# Patient Record
Sex: MALE | Race: WHITE | NOT HISPANIC OR LATINO | Employment: OTHER | URBAN - METROPOLITAN AREA
[De-identification: names, ages, dates, MRNs, and addresses within clinical notes are randomized per-mention and may not be internally consistent; named-entity substitution may affect disease eponyms.]

---

## 2023-01-01 ENCOUNTER — APPOINTMENT (OUTPATIENT)
Dept: RADIOLOGY | Facility: HOSPITAL | Age: 78
End: 2023-01-01
Payer: MEDICARE

## 2023-01-01 ENCOUNTER — OFFICE VISIT (OUTPATIENT)
Dept: PRIMARY CARE | Facility: CLINIC | Age: 78
End: 2023-01-01
Payer: MEDICARE

## 2023-01-01 ENCOUNTER — HOSPITAL ENCOUNTER (OUTPATIENT)
Dept: CARDIOLOGY | Facility: HOSPITAL | Age: 78
Discharge: HOME | End: 2023-12-01
Payer: MEDICARE

## 2023-01-01 ENCOUNTER — HOSPITAL ENCOUNTER (EMERGENCY)
Facility: HOSPITAL | Age: 78
Discharge: HOME | End: 2023-12-01
Attending: EMERGENCY MEDICINE
Payer: MEDICARE

## 2023-01-01 ENCOUNTER — HOSPITAL ENCOUNTER (EMERGENCY)
Facility: HOSPITAL | Age: 78
End: 2023-12-10
Attending: STUDENT IN AN ORGANIZED HEALTH CARE EDUCATION/TRAINING PROGRAM
Payer: MEDICARE

## 2023-01-01 VITALS
BODY MASS INDEX: 21.98 KG/M2 | HEIGHT: 75 IN | RESPIRATION RATE: 15 BRPM | TEMPERATURE: 97.9 F | OXYGEN SATURATION: 98 % | SYSTOLIC BLOOD PRESSURE: 162 MMHG | WEIGHT: 176.81 LBS | HEART RATE: 46 BPM | DIASTOLIC BLOOD PRESSURE: 65 MMHG

## 2023-01-01 VITALS
RESPIRATION RATE: 18 BRPM | WEIGHT: 176 LBS | DIASTOLIC BLOOD PRESSURE: 74 MMHG | BODY MASS INDEX: 21.88 KG/M2 | OXYGEN SATURATION: 97 % | SYSTOLIC BLOOD PRESSURE: 138 MMHG | HEART RATE: 50 BPM | HEIGHT: 75 IN

## 2023-01-01 VITALS — OXYGEN SATURATION: 94 % | BODY MASS INDEX: 21.99 KG/M2 | WEIGHT: 175.93 LBS

## 2023-01-01 DIAGNOSIS — R63.4 WEIGHT LOSS: ICD-10-CM

## 2023-01-01 DIAGNOSIS — S09.90XA INJURY OF HEAD, INITIAL ENCOUNTER: ICD-10-CM

## 2023-01-01 DIAGNOSIS — I46.9 CARDIAC ARREST (MULTI): Primary | ICD-10-CM

## 2023-01-01 DIAGNOSIS — R00.1 BRADYCARDIA: ICD-10-CM

## 2023-01-01 DIAGNOSIS — R55 SYNCOPE AND COLLAPSE: Primary | ICD-10-CM

## 2023-01-01 DIAGNOSIS — E87.6 HYPOKALEMIA: ICD-10-CM

## 2023-01-01 DIAGNOSIS — S00.01XA ABRASION OF SCALP, INITIAL ENCOUNTER: ICD-10-CM

## 2023-01-01 DIAGNOSIS — I48.20 CHRONIC ATRIAL FIBRILLATION (MULTI): ICD-10-CM

## 2023-01-01 DIAGNOSIS — S00.03XA CONTUSION OF SCALP, INITIAL ENCOUNTER: ICD-10-CM

## 2023-01-01 LAB
ALBUMIN SERPL-MCNC: 4.2 G/DL (ref 3.5–5)
ALP BLD-CCNC: 54 U/L (ref 35–125)
ALT SERPL-CCNC: 12 U/L (ref 5–40)
ANION GAP SERPL CALC-SCNC: 10 MMOL/L
APPEARANCE UR: CLEAR
AST SERPL-CCNC: 21 U/L (ref 5–40)
ATRIAL RATE: 42 BPM
ATRIAL RATE: 77 BPM
BASOPHILS # BLD AUTO: 0.06 X10*3/UL (ref 0–0.1)
BASOPHILS NFR BLD AUTO: 0.7 %
BILIRUB SERPL-MCNC: 1.9 MG/DL (ref 0.1–1.2)
BILIRUB UR STRIP.AUTO-MCNC: NEGATIVE MG/DL
BUN SERPL-MCNC: 16 MG/DL (ref 8–25)
CALCIUM SERPL-MCNC: 9.2 MG/DL (ref 8.5–10.4)
CHLORIDE SERPL-SCNC: 99 MMOL/L (ref 97–107)
CO2 SERPL-SCNC: 29 MMOL/L (ref 24–31)
COLOR UR: YELLOW
CREAT SERPL-MCNC: 0.8 MG/DL (ref 0.4–1.6)
EOSINOPHIL # BLD AUTO: 0.15 X10*3/UL (ref 0–0.4)
EOSINOPHIL NFR BLD AUTO: 1.7 %
ERYTHROCYTE [DISTWIDTH] IN BLOOD BY AUTOMATED COUNT: 12.3 % (ref 11.5–14.5)
GFR SERPL CREATININE-BSD FRML MDRD: 90 ML/MIN/1.73M*2
GLUCOSE SERPL-MCNC: 119 MG/DL (ref 65–99)
GLUCOSE UR STRIP.AUTO-MCNC: NORMAL MG/DL
HCT VFR BLD AUTO: 48.6 % (ref 41–52)
HGB BLD-MCNC: 17.9 G/DL (ref 13.5–17.5)
IMM GRANULOCYTES # BLD AUTO: 0.03 X10*3/UL (ref 0–0.5)
IMM GRANULOCYTES NFR BLD AUTO: 0.3 % (ref 0–0.9)
KETONES UR STRIP.AUTO-MCNC: NEGATIVE MG/DL
LEUKOCYTE ESTERASE UR QL STRIP.AUTO: NEGATIVE
LYMPHOCYTES # BLD AUTO: 1.14 X10*3/UL (ref 0.8–3)
LYMPHOCYTES NFR BLD AUTO: 12.9 %
MCH RBC QN AUTO: 35.2 PG (ref 26–34)
MCHC RBC AUTO-ENTMCNC: 36.8 G/DL (ref 32–36)
MCV RBC AUTO: 96 FL (ref 80–100)
MONOCYTES # BLD AUTO: 0.84 X10*3/UL (ref 0.05–0.8)
MONOCYTES NFR BLD AUTO: 9.5 %
NEUTROPHILS # BLD AUTO: 6.64 X10*3/UL (ref 1.6–5.5)
NEUTROPHILS NFR BLD AUTO: 74.9 %
NITRITE UR QL STRIP.AUTO: NEGATIVE
NRBC BLD-RTO: 0 /100 WBCS (ref 0–0)
PH UR STRIP.AUTO: 6.5 [PH]
PLATELET # BLD AUTO: 162 X10*3/UL (ref 150–450)
POTASSIUM SERPL-SCNC: 2.9 MMOL/L (ref 3.4–5.1)
PROT SERPL-MCNC: 7 G/DL (ref 5.9–7.9)
PROT UR STRIP.AUTO-MCNC: NEGATIVE MG/DL
Q ONSET: 223 MS
Q ONSET: 223 MS
QRS COUNT: 13 BEATS
QRS COUNT: 9 BEATS
QRS DURATION: 104 MS
QRS DURATION: 106 MS
QT INTERVAL: 422 MS
QT INTERVAL: 510 MS
QTC CALCULATION(BAZETT): 470 MS
QTC CALCULATION(BAZETT): 477 MS
QTC FREDERICIA: 458 MS
QTC FREDERICIA: 483 MS
R AXIS: -32 DEGREES
R AXIS: -8 DEGREES
RBC # BLD AUTO: 5.08 X10*6/UL (ref 4.5–5.9)
RBC # UR STRIP.AUTO: NEGATIVE /UL
SODIUM SERPL-SCNC: 138 MMOL/L (ref 133–145)
SP GR UR STRIP.AUTO: 1.02
T AXIS: 100 DEGREES
T AXIS: 169 DEGREES
T OFFSET: 434 MS
T OFFSET: 478 MS
TROPONIN T SERPL-MCNC: 18 NG/L
TROPONIN T SERPL-MCNC: 19 NG/L
UROBILINOGEN UR STRIP.AUTO-MCNC: ABNORMAL MG/DL
VENTRICULAR RATE: 51 BPM
VENTRICULAR RATE: 77 BPM
WBC # BLD AUTO: 8.9 X10*3/UL (ref 4.4–11.3)

## 2023-01-01 PROCEDURE — 96360 HYDRATION IV INFUSION INIT: CPT

## 2023-01-01 PROCEDURE — 71045 X-RAY EXAM CHEST 1 VIEW: CPT

## 2023-01-01 PROCEDURE — 82947 ASSAY GLUCOSE BLOOD QUANT: CPT

## 2023-01-01 PROCEDURE — 99291 CRITICAL CARE FIRST HOUR: CPT | Performed by: STUDENT IN AN ORGANIZED HEALTH CARE EDUCATION/TRAINING PROGRAM

## 2023-01-01 PROCEDURE — 92950 HEART/LUNG RESUSCITATION CPR: CPT | Performed by: STUDENT IN AN ORGANIZED HEALTH CARE EDUCATION/TRAINING PROGRAM

## 2023-01-01 PROCEDURE — 82374 ASSAY BLOOD CARBON DIOXIDE: CPT | Performed by: EMERGENCY MEDICINE

## 2023-01-01 PROCEDURE — 85025 COMPLETE CBC W/AUTO DIFF WBC: CPT | Performed by: EMERGENCY MEDICINE

## 2023-01-01 PROCEDURE — 96361 HYDRATE IV INFUSION ADD-ON: CPT

## 2023-01-01 PROCEDURE — 70450 CT HEAD/BRAIN W/O DYE: CPT

## 2023-01-01 PROCEDURE — 36415 COLL VENOUS BLD VENIPUNCTURE: CPT | Performed by: EMERGENCY MEDICINE

## 2023-01-01 PROCEDURE — 84484 ASSAY OF TROPONIN QUANT: CPT | Performed by: EMERGENCY MEDICINE

## 2023-01-01 PROCEDURE — 99213 OFFICE O/P EST LOW 20 MIN: CPT | Performed by: NURSE PRACTITIONER

## 2023-01-01 PROCEDURE — 93005 ELECTROCARDIOGRAM TRACING: CPT

## 2023-01-01 PROCEDURE — 1159F MED LIST DOCD IN RCRD: CPT | Performed by: NURSE PRACTITIONER

## 2023-01-01 PROCEDURE — 81003 URINALYSIS AUTO W/O SCOPE: CPT | Performed by: EMERGENCY MEDICINE

## 2023-01-01 PROCEDURE — 99284 EMERGENCY DEPT VISIT MOD MDM: CPT | Performed by: EMERGENCY MEDICINE

## 2023-01-01 PROCEDURE — 31500 INSERT EMERGENCY AIRWAY: CPT | Performed by: STUDENT IN AN ORGANIZED HEALTH CARE EDUCATION/TRAINING PROGRAM

## 2023-01-01 PROCEDURE — 2500000004 HC RX 250 GENERAL PHARMACY W/ HCPCS (ALT 636 FOR OP/ED): Performed by: EMERGENCY MEDICINE

## 2023-01-01 PROCEDURE — 1126F AMNT PAIN NOTED NONE PRSNT: CPT | Performed by: NURSE PRACTITIONER

## 2023-01-01 PROCEDURE — 99285 EMERGENCY DEPT VISIT HI MDM: CPT | Mod: 25

## 2023-01-01 PROCEDURE — 2500000001 HC RX 250 WO HCPCS SELF ADMINISTERED DRUGS (ALT 637 FOR MEDICARE OP): Performed by: EMERGENCY MEDICINE

## 2023-01-01 PROCEDURE — 92960 CARDIOVERSION ELECTRIC EXT: CPT | Performed by: STUDENT IN AN ORGANIZED HEALTH CARE EDUCATION/TRAINING PROGRAM

## 2023-01-01 PROCEDURE — 2500000004 HC RX 250 GENERAL PHARMACY W/ HCPCS (ALT 636 FOR OP/ED): Performed by: STUDENT IN AN ORGANIZED HEALTH CARE EDUCATION/TRAINING PROGRAM

## 2023-01-01 PROCEDURE — 94760 N-INVAS EAR/PLS OXIMETRY 1: CPT

## 2023-01-01 RX ORDER — ACETAMINOPHEN 325 MG/1
650 TABLET ORAL ONCE
Status: COMPLETED | OUTPATIENT
Start: 2023-01-01 | End: 2023-01-01

## 2023-01-01 RX ORDER — HYDROCHLOROTHIAZIDE 25 MG/1
25 TABLET ORAL DAILY
COMMUNITY

## 2023-01-01 RX ORDER — EPINEPHRINE 0.1 MG/ML
INJECTION INTRACARDIAC; INTRAVENOUS CODE/TRAUMA/SEDATION MEDICATION
Status: COMPLETED | OUTPATIENT
Start: 2023-01-01 | End: 2023-01-01

## 2023-01-01 RX ORDER — POTASSIUM CHLORIDE 1.5 G/1.58G
40 POWDER, FOR SOLUTION ORAL ONCE
Status: COMPLETED | OUTPATIENT
Start: 2023-01-01 | End: 2023-01-01

## 2023-01-01 RX ORDER — ASPIRIN 81 MG/1
81 TABLET ORAL ONCE
COMMUNITY
Start: 2012-02-28

## 2023-01-01 RX ORDER — INDOMETHACIN 25 MG/1
CAPSULE ORAL CODE/TRAUMA/SEDATION MEDICATION
Status: COMPLETED | OUTPATIENT
Start: 2023-01-01 | End: 2023-01-01

## 2023-01-01 RX ORDER — AMLODIPINE BESYLATE 5 MG/1
5 TABLET ORAL
COMMUNITY
Start: 2021-05-07

## 2023-01-01 RX ORDER — LOVASTATIN 40 MG/1
40 TABLET ORAL NIGHTLY
COMMUNITY
Start: 2015-11-11

## 2023-01-01 RX ADMIN — EPINEPHRINE 1 MG: 0.1 INJECTION INTRACARDIAC; INTRAVENOUS at 20:26

## 2023-01-01 RX ADMIN — ACETAMINOPHEN 650 MG: 325 TABLET ORAL at 22:44

## 2023-01-01 RX ADMIN — POTASSIUM CHLORIDE 40 MEQ: 1.5 FOR SOLUTION ORAL at 22:45

## 2023-01-01 RX ADMIN — SODIUM BICARBONATE 50 MEQ: 84 INJECTION, SOLUTION INTRAVENOUS at 20:24

## 2023-01-01 RX ADMIN — EPINEPHRINE 1 MG: 0.1 INJECTION INTRACARDIAC; INTRAVENOUS at 20:30

## 2023-01-01 RX ADMIN — EPINEPHRINE 1 MG: 0.1 INJECTION INTRACARDIAC; INTRAVENOUS at 20:19

## 2023-01-01 RX ADMIN — SODIUM CHLORIDE 500 ML: 9 INJECTION, SOLUTION INTRAVENOUS at 21:50

## 2023-01-01 RX ADMIN — EPINEPHRINE 1 MG: 0.1 INJECTION INTRACARDIAC; INTRAVENOUS at 20:23

## 2023-01-01 ASSESSMENT — ENCOUNTER SYMPTOMS
FACIAL ASYMMETRY: 0
PHOTOPHOBIA: 0
TREMORS: 0
HEADACHES: 0
SEIZURES: 0
FLANK PAIN: 0
NAUSEA: 0
JOINT SWELLING: 0
ACTIVITY CHANGE: 0
BRUISES/BLEEDS EASILY: 0
CHILLS: 0
FACIAL SWELLING: 0
DIZZINESS: 0
DIAPHORESIS: 0
SINUS PAIN: 0
LIGHT-HEADEDNESS: 0
CHEST TIGHTNESS: 0
DIARRHEA: 0
HEMATURIA: 0
SPEECH DIFFICULTY: 0
FEVER: 0
SHORTNESS OF BREATH: 0
COUGH: 0
DYSURIA: 0
VOMITING: 0
EYE PAIN: 0
NECK STIFFNESS: 0
NECK PAIN: 0
ABDOMINAL PAIN: 0
WEAKNESS: 0
MYALGIAS: 0
BLOOD IN STOOL: 0
DIFFICULTY URINATING: 0
CONFUSION: 0
APPETITE CHANGE: 0
PALPITATIONS: 0
NUMBNESS: 0
FATIGUE: 0
FREQUENCY: 0
BACK PAIN: 0

## 2023-01-01 ASSESSMENT — PATIENT HEALTH QUESTIONNAIRE - PHQ9
1. LITTLE INTEREST OR PLEASURE IN DOING THINGS: NOT AT ALL
SUM OF ALL RESPONSES TO PHQ9 QUESTIONS 1 AND 2: 0
2. FEELING DOWN, DEPRESSED OR HOPELESS: NOT AT ALL

## 2023-01-01 ASSESSMENT — COLUMBIA-SUICIDE SEVERITY RATING SCALE - C-SSRS
6. HAVE YOU EVER DONE ANYTHING, STARTED TO DO ANYTHING, OR PREPARED TO DO ANYTHING TO END YOUR LIFE?: NO
2. HAVE YOU ACTUALLY HAD ANY THOUGHTS OF KILLING YOURSELF?: NO
1. IN THE PAST MONTH, HAVE YOU WISHED YOU WERE DEAD OR WISHED YOU COULD GO TO SLEEP AND NOT WAKE UP?: NO

## 2023-01-01 ASSESSMENT — PAIN SCALES - WONG BAKER: WONGBAKER_NUMERICALRESPONSE: HURTS LITTLE BIT

## 2023-01-01 ASSESSMENT — PAIN - FUNCTIONAL ASSESSMENT
PAIN_FUNCTIONAL_ASSESSMENT: UNABLE TO SELF-REPORT
PAIN_FUNCTIONAL_ASSESSMENT: WONG-BAKER FACES

## 2023-12-01 NOTE — ED PROVIDER NOTES
HPI   Chief Complaint   Patient presents with   • Syncope     Patient brought in by EMS for complaints of syncope with a fall, patient is on bloodthiners       78-year-old male with a past medical history significant for atrial fibrillation and syncopal episodes presents to the emergency department after having a witnessed syncopal episode.  While in a seated position he passed out and fell forward striking his head on the ground.  He was at the [facility] when this occurred.  He had no symptoms before passing out.  He states he remembers listening to music and the next thing was people surrounding him while he was on the floor.  He is on blood thinners.  He has no complaints of head or neck pain.  No complaints of chest discomfort, lightheadedness, dizziness, palpitations, nausea, or vomiting before or after syncopal episode.  He has had similar syncopal episodes in the past.      Review of Systems   Constitutional:  Negative for activity change, appetite change, chills, diaphoresis, fatigue and fever.   HENT:  Negative for congestion, ear pain, facial swelling, nosebleeds, sinus pain and tinnitus.    Eyes:  Negative for photophobia, pain and visual disturbance.   Respiratory:  Negative for cough, chest tightness and shortness of breath.    Cardiovascular:  Negative for chest pain, palpitations and leg swelling.   Gastrointestinal:  Negative for abdominal pain, blood in stool, diarrhea, nausea and vomiting.   Genitourinary:  Negative for decreased urine volume, difficulty urinating, dysuria, flank pain, frequency, hematuria and urgency.   Musculoskeletal:  Negative for back pain, joint swelling, myalgias, neck pain and neck stiffness.   Neurological:  Positive for syncope. Negative for dizziness, tremors, seizures, facial asymmetry, speech difficulty, weakness, light-headedness, numbness and headaches.   Hematological:  Does not bruise/bleed easily.   Psychiatric/Behavioral:  Negative for confusion.         Patient History   History reviewed. No pertinent past medical history.  History reviewed. No pertinent surgical history.  No family history on file.  Social History     Tobacco Use   • Smoking status: Not on file   • Smokeless tobacco: Not on file   Substance Use Topics   • Alcohol use: Not on file   • Drug use: Not on file       Physical Exam   ED Triage Vitals [11/30/23 1851]   Temp Heart Rate Resp BP   36.6 °C (97.9 °F) (!) 40 16 174/78      SpO2 Temp Source Heart Rate Source Patient Position   95 % Oral Monitor Sitting      BP Location FiO2 (%)     Right arm --       Physical Exam  Vitals reviewed.   Constitutional:       General: He is not in acute distress.     Appearance: He is not ill-appearing, toxic-appearing or diaphoretic.   HENT:      Head: Normocephalic. Abrasion and contusion present. No raccoon eyes, Gale's sign or laceration.      Jaw: No tenderness or pain on movement.     Eyes:      Extraocular Movements: Extraocular movements intact.      Pupils: Pupils are equal, round, and reactive to light.      Comments: No Photophobia or nystagmus   Cardiovascular:      Rate and Rhythm: Normal rate. Rhythm irregular.   Pulmonary:      Effort: Pulmonary effort is normal.      Breath sounds: Normal breath sounds.   Chest:      Chest wall: No tenderness.   Abdominal:      General: There is no distension.      Palpations: Abdomen is soft. There is no mass.      Tenderness: There is no abdominal tenderness.   Musculoskeletal:         General: No tenderness or signs of injury. Normal range of motion.      Cervical back: Full passive range of motion without pain, normal range of motion and neck supple. No tenderness. No spinous process tenderness or muscular tenderness.   Skin:     General: Skin is warm and dry.   Neurological:      General: No focal deficit present.      Mental Status: He is alert and oriented to person, place, and time.         ED Course & MDM   ED Course as of 11/30/23 8347   Thu Nov 30,  2023 2108 XR chest 1 view  FINDINGS:  The cardiomediastinal silhouette is at the upper limits of normal in  size.. The lungs are clear. No pleural effusion is identified.      The osseous structures are intact.      IMPRESSION:  No acute cardiopulmonary process.   [TJ]   2108 CT head wo IV contrast  FINDINGS:  There is no mass effect, hemorrhage, or infarct. The ventricles  appear normal. Gray-white differentiation is maintained. Patchy areas  of hypodense attenuation are seen in the subcortical and  periventricular white matter; compatible with small vessel ischemic  disease. There is diffuse prominence of the ventricles and sulci  indicating the presence of mild diffuse cerebral volume loss. The  basal cisterns are patent. The visualized paranasal sinuses appear  clear.      IMPRESSION:  No acute intracranial abnormality.       [TJ]   2144 Troponin T, High Sensitivity(!): 18 [TJ]   2144 HEMOGLOBIN(!): 17.9 [TJ]   2144 HEMATOCRIT: 48.6 [TJ]   2144 GLUCOSE(!): 119 [TJ]   2222 POTASSIUM(!!): 2.9 [TJ]   2350 Troponin T, High Sensitivity(!): 19 [TJ]      ED Course User Index  [TJ] Shasha BOOGIE MD         Diagnoses as of 11/30/23 2351   Syncope and collapse   Injury of head, initial encounter   Abrasion of scalp, initial encounter   Contusion of scalp, initial encounter   Hypokalemia   Chronic atrial fibrillation (CMS/HCC)   Bradycardia       Medical Decision Making  Syncope  Closed head injury  Scalp contusion/abrasion    Differential diagnosis: Closed head injury, intracranial injury, skull fracture, ACS, hypoglycemia, dehydration, hypotension, arrhythmia, electrolyte abnormality    Exam findings, differential diagnosis, ER workup, and treatment plan discussed with the patient at bedside.  He currently has no complaints of pain.  Ice pack ordered for scalp hematoma/contusion.  He is currently declining pain management.    9:09 PM  Radiology studies reviewed.  CT head without contrast showed no acute intracranial  abnormalities.  Chest x-ray showed no acute process.  Disposition pending labs.    9:47 PM  Labs reviewed they are all within acceptable limits with the exception of a mildly elevated troponin.  Disposition will be pending repeat troponin.  EKG pulled up in EMR using care everywhere from outside facility.  It appears that the patient is chronically bradycardic and has chronic A-fib with a slow ventricular rate.  10:28 PM  Potassium 2.9.  Oral potassium replacement ordered.  Patient is now complaining of discomfort and would like something for pain.  Tylenol ordered for pain.    Amount and/or Complexity of Data Reviewed  Labs: ordered. Decision-making details documented in ED Course.  Radiology: ordered. Decision-making details documented in ED Course.  ECG/medicine tests: ordered and independent interpretation performed.     Details: 12 Lead EKG reviewed and interpreted by myself  Rate 77, atrial fibrillation, bigeminy, incomplete right bundle branch block, no ST segment elevations or depressions, no hyperacute or inverted T waves  Unable to review recent EKGs in EMR for comparison    EKG #2  Rate 51, atrial fibrillation, incomplete right bundle branch block, no ST segment elevations or depressions, nonspecific T wave abnormalities which are unchanged from previous EKG, no ectopic beats  Rate 59        Procedure  Procedures     Shasha BOOGIE MD  11/30/23 7027

## 2023-12-07 NOTE — PROGRESS NOTES
"Subjective   Patient ID: Tawanda Winn is a 78 y.o. male who presents for No chief complaint on file..Was in ER having syncopy episode,, 3 episoddes just sitting wife has azlhiemer and had 45 pound weight loss, wife left stove on and smelt gas and was given wrong Dr Roverner Severence   Request [facility] referral for neurology and cardiology in [place].   ER CT neg , recent US duplex carotid US was negative    HPI     Review of Systems    Objective   /74 (BP Location: Right arm, Patient Position: Sitting, BP Cuff Size: Adult)   Pulse 50   Resp 18   Ht 1.905 m (6' 3\")   Wt 79.8 kg (176 lb)   SpO2 97%   BMI 22.00 kg/m²     Physical Exam  Constitutional:       General: He is not in acute distress.     Appearance: Normal appearance.   Cardiovascular:      Rate and Rhythm: Normal rate and regular rhythm.      Heart sounds: No murmur heard.  Pulmonary:      Breath sounds: Normal breath sounds. No wheezing.   Neurological:      Mental Status: He is alert.   Psychiatric:         Mood and Affect: Mood normal.      Comments: Is upset about wife. Taking her to neurology appt today          Assessment/Plan   Problem List Items Addressed This Visit    None  Visit Diagnoses         Codes    Syncope and collapse    -  Primary R55    Relevant Orders    Referral to Cardiology    Referral to Neurology    Weight loss     R63.4               "

## 2023-12-11 LAB — GLUCOSE BLD MANUAL STRIP-MCNC: 121 MG/DL (ref 74–99)

## 2023-12-11 NOTE — ED PROVIDER NOTES
HPI   Chief Complaint   Patient presents with    Cardiac Arrest     Pt. Presents to the ED in cardiac arrest by [facility]. Pt. Was reportedly having sexual intercourse with his wife when he went unresponsive and his wife called 911. EMS reports wife was attempting CPR. Pt. Was asystole and given 2 rounds of epi and an amp of Bicarb. Pt. On Low upon arrival and CPR continued.       This is a 78-year-old male brought to the ED in cardiac arrest.  He was at home with his wife who has dementia.  He had an unknown downtime, wife does have dementia and was trying to form CPR when EMS arrived but she could not provide meaningful history of how long he had been unresponsive for.  Per EMS his initial rhythm was asystole.  He was intubated in the field and on the way to the emergency department EMS crew states that although they could not feel a pulse they did regain a rhythm on the monitor and their blood pressure cuff did give him a blood pressure so they stopped CPR en route when this happened.  They state that his pulse became more bradycardic and they were unable to measure blood pressure as they near the hospital so CPR was restarted via Low device.      History provided by:  Patient   used: No                        No data recorded                Patient History   No past medical history on file.  No past surgical history on file.  No family history on file.  Social History     Tobacco Use    Smoking status: Not on file    Smokeless tobacco: Not on file   Substance Use Topics    Alcohol use: Not on file    Drug use: Not on file       Physical Exam   ED Triage Vitals [12/10/23 2037]   Temp Heart Rate Resp BP   -- (!) 0 -- (!) 0/0      SpO2 Temp src Heart Rate Source Patient Position   94 % -- -- --      BP Location FiO2 (%)     -- --       Physical Exam  General: well developed, well nourished elderly male presenting in cardiac arrest, unresponsive, Low device providing chest compressions  and intubated already in the field.  Eyes: sclera clear bilaterally, pupils about 5 mm and nonreactive bilaterally  HENT: normocephalic, atraumatic. Pharynx without erythema or exudates, uvula midline.  No vomitus or bleeding in the oropharynx.  CV: Pulseless, initial rhythm PEA  Resp: Equal breath sounds auscultated bilaterally with bag-valve-mask respirations  GI: abdomen soft, nondistended, no masses palpated  MSK: No obvious deformity or injury to the extremities, no obvious swelling of the legs.  Neuro: Patient unresponsive  Skin: warm, dry, without evidence of rash or abrasions    ED Course & MDM   Diagnoses as of 12/10/23 2047   Cardiac arrest (CMS/[facility])       Medical Decision Making  PMH: Hypertension, hyperlipidemia, atrial fibrillation on Xarelto  History obtained: From EMS crew and via chart review from previous ED and outpatient visits  Social factors affecting disposition: none    Patient arrived in cardiac arrest with chest compressions ongoing.  EMS crew was concerned that the ET tube came dislodged on arrival to the emergency department, he was reintubated by myself without difficulty.  Exam shows equal breath sounds to bilateral lung fields.  Patient is an unknown downtime but EMS thinks he may have gotten a pulse back in the field, they do not palpate a pulse but their blood pressure cuff did read that he had a blood pressure when he saw PA on the monitor so they stopped compressions temporarily.  Once the electrical activity slowed down they lost the ability to measure her blood pressure and started compressions again before they got to the ED.  Is unclear if he ever did regain any spontaneous circulation.    In the ED he presents in PEA. He was given IV fluids, sodium bicarbonate and calcium to stabilize the cardiac membrane and treat any acidosis or hyperkalemia that might have caused his arrest, blood glucose level is 121 ruling out hypoglycemia as the cause of his presentation.  He remained in  PEA and then converted to ventricular fibrillation.  He was defibrillated twice, rhythm progressed to asystole.  Point-of-care ultrasound was performed by myself at bedside showing no cardiac tamponade, no dilation of the right ventricle suggestive of massive PE.  There was no cardiac motion visualized whatsoever.  At this point the likelihood of any meaningful recovery is dismal.  There is no evidence of any obvious reversible cause for his arrest.  Time of death was called at 2032.    Procedure  Critical Care    Performed by: Ryan Pearson DO  Authorized by: Ryan Pearson DO    Critical care provider statement:     Critical care time (minutes):  30    Critical care time was exclusive of:  Separately billable procedures and treating other patients    Critical care was necessary to treat or prevent imminent or life-threatening deterioration of the following conditions:  Circulatory failure    Critical care was time spent personally by me on the following activities:  Ordering and performing treatments and interventions, development of treatment plan with patient or surrogate, evaluation of patient's response to treatment, examination of patient and review of old charts    I assumed direction of critical care for this patient from another provider in my specialty: no      Care discussed with comment:  Pateint's family  Intubation    Performed by: Ryan Pearson DO  Authorized by: Ryan Pearson DO    Consent:     Consent obtained:  Emergent situation  Universal protocol:     Patient identity confirmed:  Anonymous protocol, patient vented/unresponsive  Pre-procedure details:     Indications: cardio/pulmonary arrest      Patient status:  Unresponsive    Look externally: no concerns      Pharmacologic strategy: none      Induction agents:  None    Paralytics:  None  Procedure details:     Preoxygenation:  Bag valve mask    CPR in progress: yes      Number of attempts:  1  Successful intubation  attempt details:     Intubation method:  Oral    Intubation technique: video assisted      Laryngoscope blade:  Hypercurved    Bougie used: no      Grade view: I      Tube size (mm):  7.5    Tube type:  Cuffed    Tube visualized through cords: yes    Placement assessment:     ETT at teeth/gumline (cm):  21    Tube secured with:  ETT domínguez    Breath sounds:  Equal    Placement verification: chest rise, colorimetric ETCO2, direct visualization, equal breath sounds, numeric ETCO2, tube exhalation and waveform ETCO2    Post-procedure details:     Procedure completion:  Tolerated well, no immediate complications       Ryan Pearson DO  12/10/23 9956

## 2023-12-11 NOTE — NURSING NOTE
Called to ED for full arrest. Arrival of patient on sukhwinder device for cpr. Assist intubation with ED physician. 7.5 ETT placed 21 @ lip. Good color change on etco2 device. Patient was bagged with 100% oxygen. ETCO2 monitor placed in line with bagging. Etco2 monitor reading 48. Continued bagging with 100% oxygen until multiple rounds of cpr and meds were not affective and ED physician called the code.

## 2023-12-11 NOTE — ED NOTES
Pt. Intubated with a 7.5 mm tube 21 @ the lip. Confirmed with auscultation and color change.     Sung Hudson RN  12/10/23 7404

## 2024-01-12 LAB
LABORATORY COMMENT REPORT: NORMAL
PATH REPORT.FINAL DX SPEC: NORMAL
PATH REPORT.MICROSCOPIC SPEC OTHER STN: NORMAL
PATH REPORT.RELEVANT HX SPEC: NORMAL
PATH REPORT.TOTAL CANCER: NORMAL
RPT COMMENT SECTION: NORMAL